# Patient Record
Sex: FEMALE | Race: WHITE | NOT HISPANIC OR LATINO | URBAN - METROPOLITAN AREA
[De-identification: names, ages, dates, MRNs, and addresses within clinical notes are randomized per-mention and may not be internally consistent; named-entity substitution may affect disease eponyms.]

---

## 2020-02-19 ENCOUNTER — EMERGENCY (EMERGENCY)
Facility: HOSPITAL | Age: 65
LOS: 1 days | Discharge: HOME | End: 2020-02-19
Attending: EMERGENCY MEDICINE | Admitting: EMERGENCY MEDICINE
Payer: MEDICARE

## 2020-02-19 VITALS
RESPIRATION RATE: 18 BRPM | SYSTOLIC BLOOD PRESSURE: 175 MMHG | WEIGHT: 136.03 LBS | HEIGHT: 66 IN | DIASTOLIC BLOOD PRESSURE: 100 MMHG | TEMPERATURE: 98 F | HEART RATE: 72 BPM | OXYGEN SATURATION: 97 %

## 2020-02-19 VITALS
TEMPERATURE: 98 F | HEART RATE: 72 BPM | RESPIRATION RATE: 18 BRPM | OXYGEN SATURATION: 96 % | SYSTOLIC BLOOD PRESSURE: 132 MMHG | DIASTOLIC BLOOD PRESSURE: 70 MMHG

## 2020-02-19 PROCEDURE — 73110 X-RAY EXAM OF WRIST: CPT

## 2020-02-19 PROCEDURE — 73090 X-RAY EXAM OF FOREARM: CPT | Mod: 26,RT

## 2020-02-19 PROCEDURE — 73090 X-RAY EXAM OF FOREARM: CPT

## 2020-02-19 PROCEDURE — 25605 CLTX DST RDL FX/EPHYS SEP W/: CPT | Mod: RT

## 2020-02-19 PROCEDURE — 99285 EMERGENCY DEPT VISIT HI MDM: CPT | Mod: 25

## 2020-02-19 PROCEDURE — 96372 THER/PROPH/DIAG INJ SC/IM: CPT | Mod: XU

## 2020-02-19 PROCEDURE — 99284 EMERGENCY DEPT VISIT MOD MDM: CPT

## 2020-02-19 PROCEDURE — 73110 X-RAY EXAM OF WRIST: CPT | Mod: 26,RT

## 2020-02-19 RX ORDER — OXYCODONE AND ACETAMINOPHEN 5; 325 MG/1; MG/1
1 TABLET ORAL ONCE
Refills: 0 | Status: DISCONTINUED | OUTPATIENT
Start: 2020-02-19 | End: 2020-02-19

## 2020-02-19 RX ORDER — HYDROMORPHONE HYDROCHLORIDE 2 MG/ML
1 INJECTION INTRAMUSCULAR; INTRAVENOUS; SUBCUTANEOUS ONCE
Refills: 0 | Status: DISCONTINUED | OUTPATIENT
Start: 2020-02-19 | End: 2020-02-19

## 2020-02-19 RX ORDER — TRAMADOL HYDROCHLORIDE 50 MG/1
1 TABLET ORAL
Qty: 14 | Refills: 0
Start: 2020-02-19 | End: 2020-02-25

## 2020-02-19 RX ADMIN — HYDROMORPHONE HYDROCHLORIDE 1 MILLIGRAM(S): 2 INJECTION INTRAMUSCULAR; INTRAVENOUS; SUBCUTANEOUS at 13:39

## 2020-02-19 RX ADMIN — OXYCODONE AND ACETAMINOPHEN 1 TABLET(S): 5; 325 TABLET ORAL at 11:57

## 2020-02-19 RX ADMIN — OXYCODONE AND ACETAMINOPHEN 1 TABLET(S): 5; 325 TABLET ORAL at 12:39

## 2020-02-19 NOTE — ED PROVIDER NOTE - CLINICAL SUMMARY MEDICAL DECISION MAKING FREE TEXT BOX
63 yo female s/p accidental mechanical fall on right outstretched hand, with pain to her right wrist. Right is dominant for pt. exam findings suspicious for fx. neurovascular injured extremity intact. pending xray and ortho evaluation.

## 2020-02-19 NOTE — ED PROVIDER NOTE - CARE PROVIDER_API CALL
Wesley Monzon)  Orthopaedic Surgery  [address]  [place], [place] [address]  Phone: [phone]  Fax: [phone]  Follow Up Time:
other

## 2020-02-19 NOTE — CONSULT NOTE ADULT - SUBJECTIVE AND OBJECTIVE BOX
Orthopaedic Consult Note    HPI  64yFemale  c/o right wrist pain s/p mechanical fall this morning. Pt states she was stepping off an escalator and missed a step, falling onto an outstretched right hand. She denies head trauma/LOC or pain to other joints. Denies numbness/tingling of right upper extremity. Pt does not ambulate with assistance at baseline. She is right hand dominant.     PAST MEDICAL & SURGICAL HISTORY:  Depression  CHRISTA (mycobacterium avium-intracellulare)  High cholesterol    Allergies    Avelox (Hives)    Intolerances          Vital Signs Last 24 Hrs  T(C): 36.4 (19 Feb 2020 14:53), Max: 36.7 (19 Feb 2020 11:14)  T(F): 97.6 (19 Feb 2020 14:53), Max: 98 (19 Feb 2020 11:14)  HR: 72 (19 Feb 2020 14:53) (72 - 72)  BP: 132/70 (19 Feb 2020 14:53) (132/70 - 175/100)  BP(mean): --  RR: 18 (19 Feb 2020 14:53) (18 - 18)  SpO2: 96% (19 Feb 2020 14:53) (96% - 97%)    Physical Exam:  Pt comfortable with right upper extremity in sling   Gross deformity of right wrist  Brisk capillary refill distal right upper extremity, Radial pulses palpable  SLT in tact to distal right upper extremity   Motor Strength 5/5 to /interossei. AIN/PIN intact. - radian ulnar median nerves in tact       Imaging:   Xray Wrist 3 Views, Right (02.19.20 @ 12:38) >  EXAM:  XR WRIST-RIGHT MIN 3 VIEWS                        EXAM:  XR FOREARM-RIGHT-2 VIEWS                        PROCEDURE DATE:  02/19/2020      Impression:  Acute comminuted fracture of the distal radius with angulation and intra-articular extension.      post reduction:    EXAM:  XR WRIST-RIGHT MIN 3 VIEWS                          PROCEDURE DATE:  02/19/2020          INTERPRETATION:  XR WRIST 3 VIEWS RIGHT dated 2/19/2020 2:08 PM.    INDICATION: Fracture status post reduction    < from: Xray Wrist 3 Views, Right (02.19.20 @ 14:08) >  Impression:  Interval reduction of distal radial fracture.        A/P: 64yFemale w/ right distal radius fracture, intra articular   Pt given hematoma block with 10cc lidocaine, right distal radius fracture reduced and sugar tong splint placed - patient tolerated procedure well and reduction confirmed via XR / reviewed by Dr. Monzon - pt neuro in tact s/p reduction   RUE Sling, NWB RUE   Pain control as needed  Discussed with Dr. Monzon   Patient to follow up with Dr. Monzon outpatient in one week

## 2020-02-19 NOTE — ED PROVIDER NOTE - OBJECTIVE STATEMENT
63 yo female in the ER s/p accidental fall earlier today on the escalator. Pt tried to stop her fall with outstretched hand and c/o pain to her right wrist. Denies any other injuries, denies numbness tingling to her right fingers. Swelling, deformity and few superficial abrasions noted.

## 2020-02-19 NOTE — ED PROVIDER NOTE - NSFOLLOWUPINSTRUCTIONS_ED_ALL_ED_FT
I have discussed the discharge plan with the patient. The patient agrees with the plan, as discussed.  The patient understands Emergency Department diagnosis is a preliminary diagnosis often based on limited information and that the patient must adhere to the follow-up plan as discussed.  The patient understands that if the symptoms worsen or if prescribed medications do not have the desired/planned effect that the patient may return to the Emergency Department at any time for further evaluation and treatment.      Cast or Splint Care, Adult  Casts and splints are supports that are worn to protect broken bones and other injuries. A cast or splint may hold a bone still and in the correct position while it heals. Casts and splints may also help ease pain, swelling, and muscle spasms.  A cast is a hardened support that is usually made of fiberglass or plaster. It is custom-fit to the body and it offers more protection than a splint. It cannot be taken off and put back on. A splint is a type of soft support that is usually made from cloth and elastic. It can be adjusted or taken off as needed.  You may need a cast or a splint if you:  Have a broken bone.Have a soft-tissue injury.Need to keep an injured body part from moving (keep it immobile) after surgery.How is this treated?  If you have a cast:        Do not stick anything inside the cast to scratch your skin. Sticking something in the cast increases your risk of infection.Check the skin around the cast every day. Tell your health care provider about any concerns.You may put lotion on dry skin around the edges of the cast. Do not put lotion on the skin underneath the cast.Keep the cast clean.If the cast is not waterproof:  Do not let it get wet.Cover it with a watertight covering when you take a bath or a shower.If you have a splint:        Wear it as told by your health care provider. Remove it only as told by your health care provider.Loosen the splint if your fingers or toes tingle, become numb, or turn cold and blue.Keep the splint clean.If the splint is not waterproof:  Do not let it get wet.Cover it with a watertight covering when you take a bath or a shower.Bathing     Do not take baths or swim until your health care provider approves. Ask your health care provider if you can take showers. You may only be allowed to take sponge baths for bathing.If your cast or splint is not waterproof, cover it with a watertight covering when you take a bath or shower.Managing pain, stiffness, and swelling     Move your fingers or toes often to avoid stiffness and to lessen swelling.Raise (elevate) the injured area above the level of your heart while sitting or lying down.Safety     Do not use the injured limb to support your body weight until your health care provider says that it is okay.Use crutches or other assistive devices as told by your health care provider.General instructions     Do not put pressure on any part of the cast or splint until it is fully hardened. This may take several hours.Return to your normal activities as told by your health care provider. Ask your health care provider what activities are safe for you.Take over-the-counter and prescription medicines only as told by your health care provider.Keep all follow-up visits as told by your health care provider. This is important.Contact a health care provider if:  Your cast or splint gets damaged.The skin around the cast gets red or raw.The skin under the cast is extremely itchy or painful.Your cast or splint feels very uncomfortable.Your cast or splint is too tight or too loose.Your cast becomes wet or it develops a soft spot or area.You get an object stuck under your cast.Get help right away if:  Your pain is getting worse.The injured area tingles, becomes numb, or turns cold and blue.The part of your body above or below the cast is swollen and discolored.You cannot feel or move your fingers or toes.There is fluid leaking through the cast.You have severe pain or pressure under the cast.You have trouble breathing.You have shortness of breath.You have chest pain.This information is not intended to replace advice given to you by your health care provider. Make sure you discuss any questions you have with your health care provider.      Colles Fracture     Colles fracture is a type of broken wrist. It means that the radius bone is broken or cracked near the wrist joint. The radius is one of two bones in the forearm. It is on the same side as the thumb. The other forearm bone is called the ulna. Often, when someone has a Colles fracture, the ulna is also broken.  As this injury heals, a splint or a cast is used to prevent the injured bone from moving (keep it immobilized).  What are the causes?  Common causes of this type of fracture include:  A hard, direct hit to the wrist.Accidents, such as a car accident or falling on an outstretched hand.What increases the risk?  You may be at higher risk for this type of fracture if you:  Play contact sports or high-risk sports, such as skiing, biking, or ice-skating.Smoke.Drink more than three alcoholic beverages a day.Have low or lowered bone density (osteoporosis or osteopenia).Are a young child or an older adult.Are a woman who has gone through menopause.Have a history of bone fractures.Are not getting enough (have a deficiency in) calcium or vitamin D.What are the signs or symptoms?  Symptoms of a Colles fracture may include:  Tenderness, bruising, and swelling over the fracture, which is usually near the wrist.The wrist hanging in an odd position or looking misshapen (deformed).Difficulty moving the wrist.How is this diagnosed?  This condition may be diagnosed based on:  A physical exam.A forearm X-ray.Your symptoms and medical history.How is this treated?  Treatment depends on many factors, including your age, your activity level, and how severe your fracture is. Treatment may include:  Immobilizing the wrist with a splint or a cast for several weeks. Before a splint or cast is placed on the arm, the health care provider may move the fractured bone or bones back into place (realignment).Surgery, if the bone is completely out of place (displaced). Metal pins or other devices may be used to help hold the bone in place while it heals. After surgery, the arm is put in a splint or cast.Physical therapy.Follow these instructions at home:  If you have a splint:     Wear the splint as told by your health care provider. Remove it only as told by your health care provider.Loosen the splint if your fingers tingle, become numb, or turn cold and blue.Keep the splint clean.If your splint is not waterproof:  Do not let it get wet.Cover it with a watertight covering when you take a bath or a shower.If you have a cast:     Do not stick anything inside the cast to scratch your skin. Doing that increases your risk for infection.Check the skin around the cast every day. Tell your health care provider about any concerns.You may put lotion on dry skin around the edges of the cast. Do not put lotion on the skin underneath the cast.Keep the cast clean.If the cast is not waterproof:   Do not let it get wet. Cover it with a watertight covering when you take a bath or a shower.Managing pain, stiffness, and swelling        If directed, put ice on the injured area:  If you have a removable splint, remove it as told by your health care provider.Put ice in a plastic bag.Place a towel between your skin and the bag, or between your cast and the bag.Leave the ice on for 20 minutes, 2–3 times a day.Move your fingers often to avoid stiffness and to lessen swelling.Raise (elevate) your wrist above the level of your heart while you are sitting or lying down.Driving     Do not drive or use heavy machinery while taking prescription pain medicine.Ask your health care provider if it is safe for you to drive if you have a splint or cast on your arm.Activity     Do not lift anything that is heavier than 10 lb (4.5 kg), or the limit that you are told, until your health care provider says that it is safe.Do not use your arm to support your body weight until your health care provider says that you can.Return to your normal activities as told by your health care provider. Ask your health care provider what activities are safe for you.If physical therapy was prescribed, do exercises as told by your health care provider.General instructions     Do not put pressure on any part of the splint or cast until it is fully hardened. This may take several hours.Do not use any products that contain nicotine or tobacco, such as cigarettes and e-cigarettes. These can delay bone healing. If you need help quitting, ask your health care provider.Take over-the-counter and prescription medicines only as told by your health care provider. Keep all follow-up visits as told by your health care provider. This is important.Contact a health care provider if:  Your splint or cast:  Gets wet.Gets damaged.Suddenly feels too tight.You have:  A fever or chills.Pain that does not get better with medicine.Swelling that gets worse.Get help right away if:  Your hand or fingernails:  Turn blue or gray.Feel cold or numb.You have tingling or numbness in your fingers, even after you loosen your splint (if this applies).Summary  Colles fracture is a type of broken wrist. It often involves both of the bones in the forearm (radius and ulna).Fractures are common in young, active people who are involved in high-energy activities. They are also common in older people who are at risk for osteoporosis.This injury is diagnosed with a physical exam and X-rays.Your wrist will need to be held in place (immobilized) with a splint or cast for several weeks. You may need surgery for a more severe fracture.This information is not intended to replace advice given to you by your health care provider. Make sure you discuss any questions you have with your health care provider.

## 2020-02-19 NOTE — ED PROVIDER NOTE - PATIENT PORTAL LINK FT
You can access the FollowMyHealth Patient Portal offered by [facility] by registering at the following website: [email or web address]. By joining [facility]’s FollowMyHealth portal, you will also be able to view your health information using other applications (apps) compatible with our system.

## 2020-02-19 NOTE — ED PROVIDER NOTE - MUSCULOSKELETAL, MLM
Spine appears normal, range of motion is not limited,  right wrist deformity, tenderness, decreased ROM due to pain, good distal pulses, normal sensations,

## 2020-02-19 NOTE — ED PROVIDER NOTE - ATTENDING CONTRIBUTION TO CARE
64F p/w FOOSH R wrist, mechanical, no LOC, denies other injuries. Exam noted for deformity, nv intact. Concern fx, dislocation, no e/o open fx. Ortho consulted, they are splinting.

## 2020-02-20 ENCOUNTER — FORM ENCOUNTER (OUTPATIENT)
Age: 65
End: 2020-02-20

## 2020-02-21 ENCOUNTER — NON-APPOINTMENT (OUTPATIENT)
Age: 65
End: 2020-02-21

## 2020-02-21 ENCOUNTER — APPOINTMENT (OUTPATIENT)
Dept: PULMONOLOGY | Facility: CLINIC | Age: 65
End: 2020-02-21
Payer: MEDICARE

## 2020-02-21 ENCOUNTER — OUTPATIENT (OUTPATIENT)
Dept: OUTPATIENT SERVICES | Facility: HOSPITAL | Age: 65
LOS: 1 days | End: 2020-02-21
Payer: MEDICARE

## 2020-02-21 VITALS
SYSTOLIC BLOOD PRESSURE: 120 MMHG | WEIGHT: 130 LBS | HEIGHT: 66 IN | DIASTOLIC BLOOD PRESSURE: 70 MMHG | HEART RATE: 74 BPM | TEMPERATURE: 97.9 F | OXYGEN SATURATION: 97 % | BODY MASS INDEX: 20.89 KG/M2

## 2020-02-21 DIAGNOSIS — Z86.59 PERSONAL HISTORY OF OTHER MENTAL AND BEHAVIORAL DISORDERS: ICD-10-CM

## 2020-02-21 DIAGNOSIS — Z86.39 PERSONAL HISTORY OF OTHER ENDOCRINE, NUTRITIONAL AND METABOLIC DISEASE: ICD-10-CM

## 2020-02-21 DIAGNOSIS — Z82.49 FAMILY HISTORY OF ISCHEMIC HEART DISEASE AND OTHER DISEASES OF THE CIRCULATORY SYSTEM: ICD-10-CM

## 2020-02-21 DIAGNOSIS — Z87.891 PERSONAL HISTORY OF NICOTINE DEPENDENCE: ICD-10-CM

## 2020-02-21 PROBLEM — Z00.00 ENCOUNTER FOR PREVENTIVE HEALTH EXAMINATION: Status: ACTIVE | Noted: 2020-02-21

## 2020-02-21 PROBLEM — F32.9 MAJOR DEPRESSIVE DISORDER, SINGLE EPISODE, UNSPECIFIED: Chronic | Status: ACTIVE | Noted: 2020-02-19

## 2020-02-21 PROBLEM — A31.0 PULMONARY MYCOBACTERIAL INFECTION: Chronic | Status: ACTIVE | Noted: 2020-02-19

## 2020-02-21 PROBLEM — E78.00 PURE HYPERCHOLESTEROLEMIA, UNSPECIFIED: Chronic | Status: ACTIVE | Noted: 2020-02-19

## 2020-02-21 PROCEDURE — 99204 OFFICE O/P NEW MOD 45 MIN: CPT | Mod: 25

## 2020-02-21 PROCEDURE — 71046 X-RAY EXAM CHEST 2 VIEWS: CPT | Mod: 26

## 2020-02-21 PROCEDURE — 36415 COLL VENOUS BLD VENIPUNCTURE: CPT

## 2020-02-21 PROCEDURE — 93000 ELECTROCARDIOGRAM COMPLETE: CPT

## 2020-02-21 RX ORDER — BUPROPION HYDROCHLORIDE 300 MG/1
TABLET, EXTENDED RELEASE ORAL
Refills: 0 | Status: ACTIVE | COMMUNITY

## 2020-02-21 RX ORDER — ATORVASTATIN CALCIUM 80 MG/1
TABLET, FILM COATED ORAL
Refills: 0 | Status: ACTIVE | COMMUNITY

## 2020-02-21 RX ORDER — FLUTICASONE FUROATE, UMECLIDINIUM BROMIDE AND VILANTEROL TRIFENATATE 100; 62.5; 25 UG/1; UG/1; UG/1
100-62.5-25 POWDER RESPIRATORY (INHALATION)
Refills: 0 | Status: ACTIVE | COMMUNITY

## 2020-02-21 NOTE — PHYSICAL EXAM
[Well Nourished] : well nourished [No Acute Distress] : no acute distress [Well Developed] : well developed [Well-Appearing] : well-appearing [Normal Sclera/Conjunctiva] : normal sclera/conjunctiva [EOMI] : extraocular movements intact [PERRL] : pupils equal round and reactive to light [No JVD] : no jugular venous distention [Normal Oropharynx] : the oropharynx was normal [Normal Outer Ear/Nose] : the outer ears and nose were normal in appearance [No Lymphadenopathy] : no lymphadenopathy [Supple] : supple [No Respiratory Distress] : no respiratory distress  [Thyroid Normal, No Nodules] : the thyroid was normal and there were no nodules present [No Accessory Muscle Use] : no accessory muscle use [Normal Rate] : normal rate  [Clear to Auscultation] : lungs were clear to auscultation bilaterally [Normal S1, S2] : normal S1 and S2 [No Murmur] : no murmur heard [Regular Rhythm] : with a regular rhythm [No Varicosities] : no varicosities [No Abdominal Bruit] : a ~M bruit was not heard ~T in the abdomen [No Carotid Bruits] : no carotid bruits [No Edema] : there was no peripheral edema [Pedal Pulses Present] : the pedal pulses are present [No Extremity Clubbing/Cyanosis] : no extremity clubbing/cyanosis [No Palpable Aorta] : no palpable aorta [Soft] : abdomen soft [No Masses] : no abdominal mass palpated [Non-distended] : non-distended [Non Tender] : non-tender [Normal Posterior Cervical Nodes] : no posterior cervical lymphadenopathy [No HSM] : no HSM [Normal Bowel Sounds] : normal bowel sounds [No CVA Tenderness] : no CVA  tenderness [Normal Anterior Cervical Nodes] : no anterior cervical lymphadenopathy [No Spinal Tenderness] : no spinal tenderness [No Joint Swelling] : no joint swelling [Grossly Normal Strength/Tone] : grossly normal strength/tone [Coordination Grossly Intact] : coordination grossly intact [No Focal Deficits] : no focal deficits [No Rash] : no rash [Normal Gait] : normal gait [Normal Affect] : the affect was normal [Deep Tendon Reflexes (DTR)] : deep tendon reflexes were 2+ and symmetric [Normal Insight/Judgement] : insight and judgment were intact

## 2020-02-21 NOTE — ASSESSMENT
[No Further Testing Recommended] : no further testing recommended [Patient Optimized for Surgery] : Patient optimized for surgery [ECG] : ECG [As per surgery] : as per surgery [FreeTextEntry4] : No contraindication for surgery. The medical condition is optimized for surgery. There is no evidence neither of angina, CHF, arrhythmias, nor valvular disease. There is no limitation of exercise capacity. VALDEMAR FALCON is to be extubated once fully awake and able to protect airway. she is to be monitored in the recovery room.  They might benefit for high flow oxygen or noninvasive ventilation to prevent or reverse atelectasis.  Avoid oversedation. Patient is high risk for DVT and will require bimodal agents for DVT prophylaxis early mobilization is recommended. Patient is to use the incentive spirometry postoperative.  The patient was given instructions regarding the preoperative preparation. I instructed the patient to notify me if there is any change in the clinical status prior the surgery including any skin manifestations. No aspirin or NSAIDs, Naproxen, LIMA inhibitors, herbs, green tea and vitamins prior to surgery. NPO after midnight prior to surg. \par \par - follow on lab work and CXR \par \par CHRISTA/bronchiectasis - currently controlled pt on treatment.  Denies cough, hemoptysis or weight loss. \par \par COPD - controlled on current triple therapy.  Denies any steroid use or hospitalizations.  \par \par HLD - pt tolerating statin follow with lipid panel with PCP\par \par Pt referred to Dr. Oliveira for PCP \par

## 2020-02-21 NOTE — RESULTS/DATA
[ECG Reviewed] : reviewed [Normal] : The 12 - lead ECG is normal [de-identified] : pending [de-identified] : pending [de-identified] : pending  [de-identified] : pending [FreeTextEntry1] : left atrial enlargement

## 2020-02-21 NOTE — HISTORY OF PRESENT ILLNESS
[COPD] : COPD [No Pertinent Cardiac History] : no history of aortic stenosis, atrial fibrillation, coronary artery disease, recent myocardial infarction, or implantable device/pacemaker [No Adverse Anesthesia Reaction] : no adverse anesthesia reaction in self or family member [Chronic Kidney Disease] : no chronic kidney disease [Diabetes] : no diabetes [(Patient denies any chest pain, claudication, dyspnea on exertion, orthopnea, palpitations or syncope)] : Patient denies any chest pain, claudication, dyspnea on exertion, orthopnea, palpitations or syncope [FreeTextEntry1] : orif right distal radius [FreeTextEntry2] : 2/24/2020 [FreeTextEntry4] : 64 yr old female with CHRISTA, bronchiectasis, COPD  Former smoker Quit 1/17/2000, approx 20 yrs 2.5 - 1 pack a day, HLD and depression.  Presents today for medical clearance for surgery on right wrist.  She [place] trying to catch the subway.\par \par Pt is following with pulmonologist for her CHRISTA and was placed on treatment 8/2019 ([facility] [phone]).  She is doing better with treatment following with frequent eye exams.  She has gained weight on therapy, cough improved and denies any hemoptysis. She is on trelegy which helps and has not needed prednisone.  \par \par Denies chest pain, abdominal pain, wheezing, diarrhea, fevers, palpitations.   [FreeTextEntry3] : Dr. Wesley Monzon [Spouse] : spouse

## 2020-02-22 ENCOUNTER — APPOINTMENT (OUTPATIENT)
Dept: CT IMAGING | Facility: HOSPITAL | Age: 65
End: 2020-02-22
Payer: MEDICARE

## 2020-02-22 ENCOUNTER — OUTPATIENT (OUTPATIENT)
Dept: OUTPATIENT SERVICES | Facility: HOSPITAL | Age: 65
LOS: 1 days | End: 2020-02-22

## 2020-02-22 PROCEDURE — 73200 CT UPPER EXTREMITY W/O DYE: CPT | Mod: 26,RT

## 2020-02-24 ENCOUNTER — OUTPATIENT (OUTPATIENT)
Dept: INPATIENT UNIT | Facility: HOSPITAL | Age: 65
LOS: 1 days | Discharge: HOME | End: 2020-02-24
Payer: MEDICARE

## 2020-02-24 VITALS
RESPIRATION RATE: 16 BRPM | WEIGHT: 131.62 LBS | DIASTOLIC BLOOD PRESSURE: 77 MMHG | HEIGHT: 66 IN | SYSTOLIC BLOOD PRESSURE: 127 MMHG | HEART RATE: 69 BPM | OXYGEN SATURATION: 97 % | TEMPERATURE: 98 F

## 2020-02-24 VITALS
RESPIRATION RATE: 15 BRPM | HEART RATE: 80 BPM | SYSTOLIC BLOOD PRESSURE: 115 MMHG | OXYGEN SATURATION: 95 % | DIASTOLIC BLOOD PRESSURE: 76 MMHG

## 2020-02-24 DIAGNOSIS — Z98.890 OTHER SPECIFIED POSTPROCEDURAL STATES: Chronic | ICD-10-CM

## 2020-02-24 LAB
ALBUMIN SERPL ELPH-MCNC: 4.7 G/DL
ALP BLD-CCNC: 88 U/L
ALT SERPL-CCNC: 19 U/L
ANION GAP SERPL CALC-SCNC: 19 MMOL/L
APPEARANCE: CLEAR
APTT BLD: 32.4 SEC
AST SERPL-CCNC: 20 U/L
BASOPHILS # BLD AUTO: 0.08 K/UL
BASOPHILS NFR BLD AUTO: 0.9 %
BILIRUB SERPL-MCNC: 0.2 MG/DL
BILIRUBIN URINE: NEGATIVE
BLOOD URINE: NEGATIVE
BUN SERPL-MCNC: 13 MG/DL
CALCIUM SERPL-MCNC: 9.8 MG/DL
CHLORIDE SERPL-SCNC: 103 MMOL/L
CO2 SERPL-SCNC: 24 MMOL/L
COLOR: NORMAL
CREAT SERPL-MCNC: 0.79 MG/DL
EOSINOPHIL # BLD AUTO: 0.2 K/UL
EOSINOPHIL NFR BLD AUTO: 2.1 %
GLUCOSE QUALITATIVE U: NEGATIVE
GLUCOSE SERPL-MCNC: 60 MG/DL
HCT VFR BLD CALC: 44.8 %
HGB BLD-MCNC: 14.1 G/DL
IMM GRANULOCYTES NFR BLD AUTO: 0.7 %
INR PPP: 0.99 RATIO
KETONES URINE: NEGATIVE
LEUKOCYTE ESTERASE URINE: NEGATIVE
LYMPHOCYTES # BLD AUTO: 1.83 K/UL
LYMPHOCYTES NFR BLD AUTO: 19.5 %
MAN DIFF?: NORMAL
MCHC RBC-ENTMCNC: 30.7 PG
MCHC RBC-ENTMCNC: 31.5 GM/DL
MCV RBC AUTO: 97.6 FL
MONOCYTES # BLD AUTO: 0.86 K/UL
MONOCYTES NFR BLD AUTO: 9.2 %
NEUTROPHILS # BLD AUTO: 6.33 K/UL
NEUTROPHILS NFR BLD AUTO: 67.6 %
NITRITE URINE: NEGATIVE
PH URINE: 5
PLATELET # BLD AUTO: 221 K/UL
POTASSIUM SERPL-SCNC: 4.8 MMOL/L
PROT SERPL-MCNC: 6.8 G/DL
PROTEIN URINE: NEGATIVE
PT BLD: 11.4 SEC
RBC # BLD: 4.59 M/UL
RBC # FLD: 13.7 %
SODIUM SERPL-SCNC: 146 MMOL/L
SPECIFIC GRAVITY URINE: 1.01
UROBILINOGEN URINE: NORMAL
WBC # FLD AUTO: 9.37 K/UL

## 2020-02-24 RX ORDER — OXYCODONE AND ACETAMINOPHEN 5; 325 MG/1; MG/1
1 TABLET ORAL
Qty: 30 | Refills: 0
Start: 2020-02-24 | End: 2020-02-28

## 2020-02-24 NOTE — BRIEF OPERATIVE NOTE - NSICDXBRIEFPROCEDURE_GEN_ALL_CORE_FT
PROCEDURES:  Open reduction and internal fixation of right distal radius and ulna 24-Feb-2020 11:46:58  Giovanyn Gilbert

## 2020-02-26 DIAGNOSIS — S52.501A UNSPECIFIED FRACTURE OF THE LOWER END OF RIGHT RADIUS, INITIAL ENCOUNTER FOR CLOSED FRACTURE: ICD-10-CM

## 2020-02-26 DIAGNOSIS — Z79.2 LONG TERM (CURRENT) USE OF ANTIBIOTICS: ICD-10-CM

## 2020-02-26 DIAGNOSIS — E78.00 PURE HYPERCHOLESTEROLEMIA, UNSPECIFIED: ICD-10-CM

## 2020-02-26 DIAGNOSIS — Z88.8 ALLERGY STATUS TO OTHER DRUGS, MEDICAMENTS AND BIOLOGICAL SUBSTANCES: ICD-10-CM
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
## 2020-02-26 DIAGNOSIS — Z79.899 OTHER LONG TERM (CURRENT) DRUG THERAPY: ICD-10-CM

## 2020-02-26 DIAGNOSIS — M25.531 PAIN IN RIGHT WRIST: ICD-10-CM
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
## 2020-02-26 DIAGNOSIS — S52.121A DISPLACED FRACTURE OF HEAD OF RIGHT RADIUS, INITIAL ENCOUNTER FOR CLOSED FRACTURE: ICD-10-CM

## 2020-03-03 PROCEDURE — 71046 X-RAY EXAM CHEST 2 VIEWS: CPT

## 2020-03-03 PROCEDURE — 73200 CT UPPER EXTREMITY W/O DYE: CPT

## 2020-03-03 PROCEDURE — C1713: CPT

## 2020-03-03 PROCEDURE — 76000 FLUOROSCOPY <1 HR PHYS/QHP: CPT

## 2020-03-03 PROCEDURE — 25608 OPTX DST RD XART FX/EPI SEP2: CPT | Mod: RT

## 2020-03-03 PROCEDURE — 25001 INCISE FLEXOR CARPI RADIALIS: CPT | Mod: RT

## 2020-05-19 NOTE — ED PROVIDER NOTE - DISPOSITION TYPE
[Time Spent: ___ minutes] : I have spent [unfilled] minutes of time on the encounter. [>50% of the face to face encounter time was spent on counseling and/or coordination of care for ___] : Greater than 50% of the face to face encounter time was spent on counseling and/or coordination of care for [unfilled] DISCHARGE

## 2020-12-02 NOTE — PATIENT PROFILE ADULT - NSPRESCRALCFREQ_GEN_A_NUR
Dorsal Nasal Flap Text: The defect edges were debeveled with a #15 scalpel blade.  Given the location of the defect and the proximity to free margins a dorsal nasal flap was deemed most appropriate.  Using a sterile surgical marker, an appropriate dorsal nasal flap was drawn around the defect.    The area thus outlined was incised deep to adipose tissue with a #15 scalpel blade.  The skin margins were undermined to an appropriate distance in all directions utilizing iris scissors. 4 or more times a week

## 2021-02-09 NOTE — PATIENT PROFILE ADULT - HAS THE PATIENT BEEN ADMITTED FROM SHORT TERM REHAB?
Problem: Pain  Goal: #Acceptable pain level achieved/maintained at rest using NRS/Faces  Description: This goal is used for patients who can self-report.  Acceptable means the level is at or below the identified comfort/function goal.  Outcome: Outcome Met, Continue evaluating goal progress toward completion     Problem: At Risk for Falls  Goal: # Patient does not fall  Outcome: Outcome Met, Continue evaluating goal progress toward completion      no

## 2021-03-17 ENCOUNTER — OUTPATIENT (OUTPATIENT)
Dept: OUTPATIENT SERVICES | Facility: HOSPITAL | Age: 66
LOS: 1 days | End: 2021-03-17
Payer: MEDICARE

## 2021-03-17 DIAGNOSIS — Z98.890 OTHER SPECIFIED POSTPROCEDURAL STATES: Chronic | ICD-10-CM

## 2021-03-17 PROBLEM — J47.9 BRONCHIECTASIS, UNCOMPLICATED: Chronic | Status: ACTIVE | Noted: 2020-02-24

## 2021-03-17 PROCEDURE — 73080 X-RAY EXAM OF ELBOW: CPT

## 2021-03-17 PROCEDURE — 73080 X-RAY EXAM OF ELBOW: CPT | Mod: 26,RT

## 2022-03-03 NOTE — PATIENT PROFILE ADULT - DO YOU FEEL UNSAFE AT SCHOOL?
LTG 1: Stairs - Pt will be independent with negotiation of 2 steps within 4 weeks./balance training/bed mobility training/gait training/transfer training
no

## 2023-03-27 ENCOUNTER — EMERGENCY (EMERGENCY)
Facility: HOSPITAL | Age: 68
LOS: 1 days | Discharge: LEFT BEFORE DISCHARGE | End: 2023-03-27
Attending: EMERGENCY MEDICINE | Admitting: EMERGENCY MEDICINE
Payer: MEDICARE

## 2023-03-27 VITALS
HEART RATE: 70 BPM | DIASTOLIC BLOOD PRESSURE: 92 MMHG | RESPIRATION RATE: 20 BRPM | TEMPERATURE: 98 F | WEIGHT: 121.03 LBS | SYSTOLIC BLOOD PRESSURE: 178 MMHG | OXYGEN SATURATION: 98 %
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
## 2023-03-27 DIAGNOSIS — J47.9 BRONCHIECTASIS, UNCOMPLICATED: ICD-10-CM

## 2023-03-27 DIAGNOSIS — Z86.19 PERSONAL HISTORY OF OTHER INFECTIOUS AND PARASITIC DISEASES: ICD-10-CM
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
## 2023-03-27 DIAGNOSIS — Z98.890 OTHER SPECIFIED POSTPROCEDURAL STATES: Chronic | ICD-10-CM

## 2023-03-27 DIAGNOSIS — Z88.5 ALLERGY STATUS TO NARCOTIC AGENT: ICD-10-CM

## 2023-03-27 DIAGNOSIS — F32.A DEPRESSION, UNSPECIFIED: ICD-10-CM

## 2023-03-27 DIAGNOSIS — S52.572A OTHER INTRAARTICULAR FRACTURE OF LOWER END OF LEFT RADIUS, INITIAL ENCOUNTER FOR CLOSED FRACTURE: ICD-10-CM

## 2023-03-27 DIAGNOSIS — E78.00 PURE HYPERCHOLESTEROLEMIA, UNSPECIFIED: ICD-10-CM

## 2023-03-27 DIAGNOSIS — Z88.1 ALLERGY STATUS TO OTHER ANTIBIOTIC AGENTS STATUS: ICD-10-CM
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
## 2023-03-27 DIAGNOSIS — S69.92XA UNSPECIFIED INJURY OF LEFT WRIST, HAND AND FINGER(S), INITIAL ENCOUNTER: ICD-10-CM

## 2023-03-27 DIAGNOSIS — Z87.81 PERSONAL HISTORY OF (HEALED) TRAUMATIC FRACTURE: ICD-10-CM

## 2023-03-27 DIAGNOSIS — Z53.29 PROCEDURE AND TREATMENT NOT CARRIED OUT BECAUSE OF PATIENT'S DECISION FOR OTHER REASONS: ICD-10-CM

## 2023-03-27 PROCEDURE — 99285 EMERGENCY DEPT VISIT HI MDM: CPT | Mod: 25

## 2023-03-27 PROCEDURE — 73070 X-RAY EXAM OF ELBOW: CPT

## 2023-03-27 PROCEDURE — 73110 X-RAY EXAM OF WRIST: CPT

## 2023-03-27 PROCEDURE — 25605 CLTX DST RDL FX/EPHYS SEP W/: CPT | Mod: 54,LT

## 2023-03-27 PROCEDURE — 99285 EMERGENCY DEPT VISIT HI MDM: CPT | Mod: FS

## 2023-03-27 PROCEDURE — 96372 THER/PROPH/DIAG INJ SC/IM: CPT | Mod: XU

## 2023-03-27 PROCEDURE — 73100 X-RAY EXAM OF WRIST: CPT

## 2023-03-27 PROCEDURE — 73100 X-RAY EXAM OF WRIST: CPT | Mod: 26,59,LT

## 2023-03-27 PROCEDURE — 99284 EMERGENCY DEPT VISIT MOD MDM: CPT | Mod: 25

## 2023-03-27 PROCEDURE — 29075 APPL CST ELBW FNGR SHORT ARM: CPT | Mod: LT,59

## 2023-03-27 PROCEDURE — 73070 X-RAY EXAM OF ELBOW: CPT | Mod: 26,LT

## 2023-03-27 PROCEDURE — 73110 X-RAY EXAM OF WRIST: CPT | Mod: 26,LT

## 2023-03-27 PROCEDURE — 25605 CLTX DST RDL FX/EPHYS SEP W/: CPT | Mod: LT

## 2023-03-27 RX ORDER — HYDROMORPHONE HYDROCHLORIDE 2 MG/ML
0.5 INJECTION INTRAMUSCULAR; INTRAVENOUS; SUBCUTANEOUS ONCE
Refills: 0 | Status: DISCONTINUED | OUTPATIENT
Start: 2023-03-27 | End: 2023-03-27

## 2023-03-27 RX ADMIN — HYDROMORPHONE HYDROCHLORIDE 0.5 MILLIGRAM(S): 2 INJECTION INTRAMUSCULAR; INTRAVENOUS; SUBCUTANEOUS at 14:43

## 2023-03-27 NOTE — ED PROVIDER NOTE - ENMT, MLM
Airway patent, Nasal mucosa clear. Mouth with normal mucosa. No head trauma. No midline c-spine tenderness. Full ROM of neck.

## 2023-03-27 NOTE — ED ADULT NURSE NOTE - OBJECTIVE STATEMENT
Pt A&Ox4, ambulatory with steady gait, speaking in clear/full sentences, no acute distress, vital signs stable. Pt presents to the ED after falling during exercise class during a balance exercise. Pt endorses left wrist pain.

## 2023-03-27 NOTE — ED PROVIDER NOTE - CLINICAL SUMMARY MEDICAL DECISION MAKING FREE TEXT BOX
66 y/o F presents to ED with injury to left wrist pain and exam concerning for colles fracture. Pt is NVI. No evidence of complication. Will obtain XR, provide analgesia, and consult ortho. Plan for closed reduction and splinting.

## 2023-03-27 NOTE — ED PROVIDER NOTE - NSICDXPASTMEDICALHX_GEN_ALL_CORE_FT
PAST MEDICAL HISTORY:  Bronchiectasis     Depression     High cholesterol     CHRISTA (mycobacterium avium-intracellulare)

## 2023-03-27 NOTE — CONSULT NOTE ADULT - SUBJECTIVE AND OBJECTIVE BOX
Orthopaedic Surgery Consult Note    CC: Patient is a 67y old  Female who presents with a chief complaint of left wrist pain.    HPI: 66yo F pmh MAC (on doxycycline) presents with left wrist pain x 1 day. Patient states she was at the gym this morning when she fell onto her buttock and left arm off of a half ball exercise . Pt denies head strike, denies LOC, denies left elbow pain.         ROS: Positive for  Denies fevers, chills, headache, dizziness, chest pain, shortness of breath, nausea, vomiting.   All other systems negative, except for above.     Allergies    Avelox (Hives)  oxycodone (Pruritus)    Intolerances      PAST MEDICAL & SURGICAL HISTORY:  High cholesterol      CHRISTA (mycobacterium avium-intracellulare)      Depression      Bronchiectasis      S/P ACL surgery  1984        Social History:    FAMILY HISTORY:    Medications:     Vital Signs Last 24 Hrs  T(C): 36.6 (27 Mar 2023 13:16), Max: 36.6 (27 Mar 2023 13:16)  T(F): 97.9 (27 Mar 2023 13:16), Max: 97.9 (27 Mar 2023 13:16)  HR: 70 (27 Mar 2023 13:16) (70 - 70)  BP: 178/92 (27 Mar 2023 13:16) (178/92 - 178/92)  BP(mean): --  RR: 20 (27 Mar 2023 13:16) (20 - 20)  SpO2: 98% (27 Mar 2023 13:16) (98% - 98%)    Parameters below as of 27 Mar 2023 13:16  Patient On (Oxygen Delivery Method): room air        PE:  General: Well developed, well nourished, in no acute distress, comfortable  Neuro: Alert, oriented x 3  Psych: Normal mood & affect   Skin: Warm, dry, extremities well perfused, no obvious rash  MSK:    -Inspection/palpation:    -Sensation:    -Motor:     -ROM:    -Pulses:                   Imaging:     A/P: 67yFemale      Risks and benefits were discussed for   Weight bearing status  Conservative treatment rest, ice, elevation, NSAIDs  Patient history, exam, imaging and plan discussed with   who is in agreement    Ortho Pager [phone]    ___ minutes spent on total encounter, over 50% of the visit was spent counseling and/or coordinating care.    Orthopaedic Surgery Consult Note    CC: Patient is a 67y old  Female who presents with a chief complaint of left wrist pain.    HPI: 66yo F pmhx MAC (on doxycycline) presents with left wrist pain x 1 day. Patient states she was at the gym this morning when she fell onto her buttock and left arm off of a half ball exercise . Pt denies head strike, denies LOC, denies left elbow pain, denies numbness and tingling, denies any other injuries from fall. Pt is known to Dr. Monzon who treated her in 2020 for right distal radius fracture with ORIF.         ROS: Positive for above  All other systems negative, except for above.     Allergies    Avelox (Hives)  oxycodone (Pruritus)    Intolerances      PAST MEDICAL & SURGICAL HISTORY:  High cholesterol  CHRISTA (mycobacterium avium-intracellulare)  Depression  Bronchiectasis      S/P ACL surgery  1984        Social History:    FAMILY HISTORY:    Medications:     Vital Signs Last 24 Hrs  T(C): 36.6 (27 Mar 2023 13:16), Max: 36.6 (27 Mar 2023 13:16)  T(F): 97.9 (27 Mar 2023 13:16), Max: 97.9 (27 Mar 2023 13:16)  HR: 70 (27 Mar 2023 13:16) (70 - 70)  BP: 178/92 (27 Mar 2023 13:16) (178/92 - 178/92)  BP(mean): --  RR: 20 (27 Mar 2023 13:16) (20 - 20)  SpO2: 98% (27 Mar 2023 13:16) (98% - 98%)    Parameters below as of 27 Mar 2023 13:16  Patient On (Oxygen Delivery Method): room air        PE:  VS: stable, reviewed per nursing documentation  General: No acute distress, resting comfortably  Neuro: Alert, oriented x 3  Psych: Normal mood & affect  HEENT: normocephalic, atraumatic   Neck: trachea midline  Respiratory: nonlabored on room air   CV: no cyanosis, no peripheral edema   Skin: Warm, dry, no rash, no lesions, no abrasions no ecchymosis   MSK: atraumatic with exception of below  LUE    -Inspection/palpation: no break in skin, superficial abrasion on distal volar forearm ,  + tenderness to palpation over the dorsum of the radius, + swelling over the left wrist generalized, + deformity of the left wrist with dorsal angulation    -ROM: limited AROM/PROM secondary to pain of the left wrist secondary to pain, able to wiggle fingers    -Motor: triceps/biceps/ strength 5/5 bilateral upper extremities    - AIN/PIN/Ulnar/Median: firing LUE     -Sensation: intact to light touch bilateral upper extremities    -Pulses: 2+ radial pulses bilaterally, symmetric          Imaging: XR Left wrist: AP/lateral: left comminuted distal radius fracture.   XR left elbow - no evidence of fracture    A/P: 67yFemale with left wrist fracture  - Sugar tong splint applied: NVI following splint application, wiggling fingers appropriately  - Keep left sling on at all times, do not remove splint, keep dry   - Weight bearing status: Nonweightbearing left upper extremity, sling   - Pain control  - Post reduction films  - Follow up with Dr. Monzon outpatient tomorrow or Wednesday for surgical planning  - Went back to ED to see patient and her and her  already discharged  Patient history, exam, imaging and plan discussed with Dr. Monzon who is in agreement    Ortho Pager [phone]   Orthopaedic Surgery Consult Note    CC: Patient is a 67y old  Female who presents with a chief complaint of left wrist pain.    HPI: 66yo F pmhx MAC (on doxycycline) presents with left wrist pain x 1 day. Patient states she was at the gym this morning when she fell onto her buttock and left arm off of a half ball exercise . Pt denies head strike, denies LOC, denies left elbow pain, denies numbness and tingling, denies any other injuries from fall. Pt is known to Dr. Monzon who treated her in 2020 for right distal radius fracture with ORIF.         ROS: Positive for above  All other systems negative, except for above.     Allergies    Avelox (Hives)  oxycodone (Pruritus)    Intolerances      PAST MEDICAL & SURGICAL HISTORY:  High cholesterol  CHRISTA (mycobacterium avium-intracellulare)  Depression  Bronchiectasis      S/P ACL surgery  1984        Social History:    FAMILY HISTORY:    Medications:     Vital Signs Last 24 Hrs  T(C): 36.6 (27 Mar 2023 13:16), Max: 36.6 (27 Mar 2023 13:16)  T(F): 97.9 (27 Mar 2023 13:16), Max: 97.9 (27 Mar 2023 13:16)  HR: 70 (27 Mar 2023 13:16) (70 - 70)  BP: 178/92 (27 Mar 2023 13:16) (178/92 - 178/92)  BP(mean): --  RR: 20 (27 Mar 2023 13:16) (20 - 20)  SpO2: 98% (27 Mar 2023 13:16) (98% - 98%)    Parameters below as of 27 Mar 2023 13:16  Patient On (Oxygen Delivery Method): room air        PE:  VS: stable, reviewed per nursing documentation  General: No acute distress, resting comfortably  Neuro: Alert, oriented x 3  Psych: Normal mood & affect  HEENT: normocephalic, atraumatic   Neck: trachea midline  Respiratory: nonlabored on room air   CV: no cyanosis, no peripheral edema   Skin: Warm, dry, no rash, no lesions, no abrasions no ecchymosis   MSK: atraumatic with exception of below  LUE    -Inspection/palpation: no break in skin, superficial abrasion on distal volar forearm ,  + tenderness to palpation over the dorsum of the radius, + swelling over the left wrist generalized, + deformity of the left wrist with dorsal angulation    -ROM: limited AROM/PROM secondary to pain of the left wrist, able to wiggle fingers    -Motor: triceps/biceps/ strength 5/5 bilateral upper extremities    - AIN/PIN/Ulnar/Median: firing LUE     -Sensation: intact to light touch bilateral upper extremities    -Pulses: 2+ radial pulses bilaterally, symmetric        Imaging: XR Left wrist: AP/lateral: left intraarticular distal radius fracture   XR left elbow - no fracture appreciated    A/P: 67yFemale with left wrist fracture  - Sugar tong splint applied: NVI following splint application, wiggling fingers appropriately  - Keep left sling on at all times, do not remove splint, keep dry   - Weight bearing status: Nonweightbearing left upper extremity, sling   - Pain control  - Post reduction films  - Follow up with Dr. Monzon outpatient tomorrow for surgical planning  - Went back to ED to see patient and her and her  already discharged  Patient history, exam, imaging and plan discussed with Dr. Monzon's PA, Dae Hunt, who is in agreement with plan.     Ortho Pager [phone]

## 2023-03-27 NOTE — ED PROVIDER NOTE - PROGRESS NOTE DETAILS
monika : pt eloped prior to ortho coming back down to el[place]. called pt and  and they said they have an appt to see Dr. Monzon tomorrow

## 2023-03-27 NOTE — ED PROVIDER NOTE - OBJECTIVE STATEMENT
66 y/o F RHD with PMHx MAC (on doxycycline), s/p ORIF of right distal radius in 2020 by Dr. Wesley Monzon presents to ED s/p mechanical trip and fall from standing height with FOOSH injury to left distal radius/wrist pta without associated weakness, numbness, tingling, or other injuries from the fall.

## 2023-03-27 NOTE — ED PROVIDER NOTE - MUSCULOSKELETAL, MLM
All bony prominences palpation except left distal radius where there is marked swelling and deformity present. Decreased ROM of left wrist. Abrasion noted to medial aspect of distal left ulna but no skin puncture. Strong radial pulses. Cap refill is <2s in all 5 fingers. Extremity is warm and well perfused. Normal color and temperature. Soft compartments.

## 2023-03-28 NOTE — CHART NOTE - NSCHARTNOTEFT_GEN_A_CORE
Had intended to eval patient independently with additional fluoroscopy images. Patient eloped before could attempt

## 2023-03-29 ENCOUNTER — NON-APPOINTMENT (OUTPATIENT)
Age: 68
End: 2023-03-29

## 2023-03-29 ENCOUNTER — APPOINTMENT (OUTPATIENT)
Dept: PULMONOLOGY | Facility: CLINIC | Age: 68
End: 2023-03-29
Payer: MEDICARE

## 2023-03-29 VITALS
OXYGEN SATURATION: 94 % | HEIGHT: 66 IN | BODY MASS INDEX: 19.44 KG/M2 | TEMPERATURE: 98.1 F | SYSTOLIC BLOOD PRESSURE: 151 MMHG | DIASTOLIC BLOOD PRESSURE: 92 MMHG | WEIGHT: 121 LBS | HEART RATE: 118 BPM

## 2023-03-29 DIAGNOSIS — A31.0 PULMONARY MYCOBACTERIAL INFECTION: ICD-10-CM

## 2023-03-29 DIAGNOSIS — Z01.818 ENCOUNTER FOR OTHER PREPROCEDURAL EXAMINATION: ICD-10-CM

## 2023-03-29 DIAGNOSIS — J44.9 CHRONIC OBSTRUCTIVE PULMONARY DISEASE, UNSPECIFIED: ICD-10-CM

## 2023-03-29 DIAGNOSIS — J47.9 BRONCHIECTASIS, UNCOMPLICATED: ICD-10-CM

## 2023-03-29 DIAGNOSIS — Z01.811 ENCOUNTER FOR PREPROCEDURAL RESPIRATORY EXAMINATION: ICD-10-CM

## 2023-03-29 PROCEDURE — 93000 ELECTROCARDIOGRAM COMPLETE: CPT

## 2023-03-29 PROCEDURE — 99204 OFFICE O/P NEW MOD 45 MIN: CPT | Mod: 25

## 2023-03-29 PROCEDURE — 36415 COLL VENOUS BLD VENIPUNCTURE: CPT

## 2023-03-29 PROCEDURE — 71045 X-RAY EXAM CHEST 1 VIEW: CPT

## 2023-03-29 RX ORDER — CLONAZEPAM 1 MG/1
1 TABLET ORAL
Refills: 0 | Status: ACTIVE | COMMUNITY
Start: 2023-03-29

## 2023-03-29 RX ORDER — RIFAMPIN 300 MG/1
CAPSULE ORAL
Refills: 0 | Status: DISCONTINUED | COMMUNITY
End: 2023-03-29

## 2023-03-29 RX ORDER — AZITHROMYCIN 250 MG/1
TABLET, FILM COATED ORAL
Refills: 0 | Status: DISCONTINUED | COMMUNITY
End: 2023-03-29

## 2023-03-29 RX ORDER — ASPIRIN 81 MG
81 TABLET, DELAYED RELEASE (ENTERIC COATED) ORAL
Refills: 0 | Status: DISCONTINUED | COMMUNITY
End: 2023-03-29

## 2023-03-29 RX ORDER — ETHAMBUTOL HYDROCHLORIDE 400 MG/1
TABLET ORAL
Refills: 0 | Status: DISCONTINUED | COMMUNITY
End: 2023-03-29

## 2023-03-29 NOTE — ASSESSMENT
[Patient Optimized for Surgery] : Patient optimized for surgery [No Further Testing Recommended] : no further testing recommended [ECG] : ECG [As per surgery] : as per surgery [High Risk Surgery - Intraperitoneal, Intrathoracic or Supringuinal Vascular Procedures] : High Risk Surgery - Intraperitoneal, Intrathoracic or Supringuinal Vascular Procedures - No (0) [Ischemic Heart Disease] : Ischemic Heart Disease - No (0) [Congestive Heart Failure] : Congestive Heart Failure - No (0) [Prior Cerebrovascular Accident or TIA] : Prior Cerebrovascular Accident or TIA - No (0) [Creatinine >= 2mg/dL (1 Point)] : Creatinine >= 2mg/dL - No (0) [Insulin-dependent Diabetic (1 Point)] : Insulin-dependent Diabetic - No (0) [0] : 0 , RCRI Class: I, Risk of Post-Op Cardiac Complications: 3.9%, 95% CI for Risk Estimate: 2.8% - 5.4% [FreeTextEntry4] : No contraindication for surgery. The medical condition is optimized for surgery. There is no evidence neither of angina, CHF, arrhythmias, nor valvular disease. There is no limitation of exercise capacity. VALDEMAR FALCON is to be extubated once fully awake and able to protect airway. she is to be monitored in the recovery room.  They might benefit for high flow oxygen or noninvasive ventilation to prevent or reverse atelectasis.  Avoid oversedation.The patient was given instructions regarding the preoperative preparation. I instructed the patient to notify me if there is any change in the clinical status prior the surgery including any skin manifestations. No aspirin or NSAIDs, Naproxen, LIMA inhibitors, herbs, green tea and vitamins prior to surgery. NPO after midnight prior to surg. \par \par CHRISTA/bronchiectasis - currently controlled pt on treatment.  Denies cough, hemoptysis or weight loss. Following with pulmonary.\par \par COPD - controlled on current triple therapy.  Denies any steroid use or hospitalizations.  \par \par HLD - pt tolerating statin follow with lipid panel with PCP\par \par

## 2023-03-29 NOTE — END OF VISIT
[Time Spent: ___ minutes] : I have spent [unfilled] minutes of time on the encounter. [>50% of the face to face encounter time was spent on counseling and/or coordination of care for ___] : Greater than 50% of the face to face encounter time was spent on counseling and/or coordination of care for [unfilled] [FreeTextEntry3] : Pt seen with YOLANDA Guthrie and agree on the above plan of care.  stable  cxr worsened from interstitial markings.  On rx for mac

## 2023-03-29 NOTE — RESULTS/DATA
[ECG Reviewed] : reviewed [Normal] : The 12 - lead ECG is normal [de-identified] : pending [de-identified] : pending [de-identified] : CXR in office.  With bilateral predominant markings worse in the LLL. No pleural effusion or PNA.  [de-identified] : pending  [FreeTextEntry1] : left atrial enlargement

## 2023-03-29 NOTE — HISTORY OF PRESENT ILLNESS
[No Pertinent Cardiac History] : no history of aortic stenosis, atrial fibrillation, coronary artery disease, recent myocardial infarction, or implantable device/pacemaker [COPD] : COPD [No Adverse Anesthesia Reaction] : no adverse anesthesia reaction in self or family member [(Patient denies any chest pain, claudication, dyspnea on exertion, orthopnea, palpitations or syncope)] : Patient denies any chest pain, claudication, dyspnea on exertion, orthopnea, palpitations or syncope [Spouse] : spouse [Chronic Anticoagulation] : no chronic anticoagulation [Chronic Kidney Disease] : no chronic kidney disease [Diabetes] : no diabetes [FreeTextEntry1] : Left Wrist Fracture  [FreeTextEntry2] : 4/3/2023 [FreeTextEntry3] : Dr. Wesley Monzon [FreeTextEntry4] : 64 yr old female with CHRISTA, bronchiectasis, COPD, appendectomy,  Former smoker Quit 1/17/2000, approx 20 yrs 2.5 - 1 pack a day, HLD and depression.  Presents today for medical clearance for surgery on left wrist.  She fell when working out with her  and broke her left wrist.\par \par Pt is currently on Doxycycline for 2 weeks due to worsening MAC.  She is scheduled for CT scan at [facility] on Friday to further evaluate the MAC.  Denies hemoptysis or fever.  She has had improvement in cough with AntibiOtic.  She uses the as needed vest, aerobika and saline nebulizer for her MAC.  Aug needed IV AntibiOtic for her MAC.  Pt is following with pulmonologist for her CHRISTA  ([facility] [phone]).  She is doing better with treatment. She is on trelegy which helps and has not needed prednisone.  \par \par Denies chest pain, abdominal pain, wheezing, diarrhea, fevers, palpitations.

## 2023-03-31 VITALS
DIASTOLIC BLOOD PRESSURE: 86 MMHG | RESPIRATION RATE: 18 BRPM | HEART RATE: 78 BPM | OXYGEN SATURATION: 98 % | WEIGHT: 123.46 LBS | HEIGHT: 66 IN | TEMPERATURE: 98 F | SYSTOLIC BLOOD PRESSURE: 137 MMHG

## 2023-03-31 RX ORDER — ASPIRIN/CALCIUM CARB/MAGNESIUM 324 MG
0 TABLET ORAL
Qty: 0 | Refills: 0 | DISCHARGE

## 2023-03-31 RX ORDER — ALENDRONATE SODIUM 70 MG/1
0 TABLET ORAL
Refills: 0 | DISCHARGE

## 2023-03-31 RX ORDER — AZITHROMYCIN 500 MG/1
0 TABLET, FILM COATED ORAL
Qty: 0 | Refills: 0 | DISCHARGE

## 2023-03-31 RX ORDER — ETHAMBUTOL HYDROCHLORIDE 400 MG/1
0 TABLET, FILM COATED ORAL
Qty: 0 | Refills: 0 | DISCHARGE

## 2023-03-31 RX ORDER — ASCORBIC ACID 60 MG
1 TABLET,CHEWABLE ORAL
Refills: 0 | DISCHARGE

## 2023-03-31 RX ORDER — CLONAZEPAM 1 MG
0 TABLET ORAL
Qty: 0 | Refills: 0 | DISCHARGE

## 2023-03-31 NOTE — ASU PATIENT PROFILE, ADULT - FALL HARM RISK - HARM RISK INTERVENTIONS

## 2023-04-02 ENCOUNTER — TRANSCRIPTION ENCOUNTER (OUTPATIENT)
Age: 68
End: 2023-04-02

## 2023-04-02 LAB
ALBUMIN SERPL ELPH-MCNC: 4.3 G/DL
ALP BLD-CCNC: 115 U/L
ALT SERPL-CCNC: 18 U/L
ANION GAP SERPL CALC-SCNC: 12 MMOL/L
APPEARANCE: CLEAR
APTT BLD: 34 SEC
AST SERPL-CCNC: 18 U/L
BASOPHILS # BLD AUTO: 0.09 K/UL
BASOPHILS NFR BLD AUTO: 0.7 %
BILIRUB SERPL-MCNC: 0.3 MG/DL
BILIRUBIN URINE: NEGATIVE
BLOOD URINE: NEGATIVE
BUN SERPL-MCNC: 12 MG/DL
CALCIUM SERPL-MCNC: 9.7 MG/DL
CHLORIDE SERPL-SCNC: 102 MMOL/L
CO2 SERPL-SCNC: 26 MMOL/L
COLOR: YELLOW
CREAT SERPL-MCNC: 0.87 MG/DL
EGFR: 72 ML/MIN/1.73M2
EOSINOPHIL # BLD AUTO: 0.14 K/UL
EOSINOPHIL NFR BLD AUTO: 1.1 %
GLUCOSE QUALITATIVE U: NEGATIVE
GLUCOSE SERPL-MCNC: 103 MG/DL
HCT VFR BLD CALC: 42.9 %
HGB BLD-MCNC: 13.1 G/DL
IMM GRANULOCYTES NFR BLD AUTO: 0.7 %
INR PPP: 1.16 RATIO
KETONES URINE: NEGATIVE
LEUKOCYTE ESTERASE URINE: NEGATIVE
LYMPHOCYTES # BLD AUTO: 1.85 K/UL
LYMPHOCYTES NFR BLD AUTO: 14.9 %
MAN DIFF?: NORMAL
MCHC RBC-ENTMCNC: 27.4 PG
MCHC RBC-ENTMCNC: 30.5 GM/DL
MCV RBC AUTO: 89.7 FL
MONOCYTES # BLD AUTO: 1.02 K/UL
MONOCYTES NFR BLD AUTO: 8.2 %
NEUTROPHILS # BLD AUTO: 9.21 K/UL
NEUTROPHILS NFR BLD AUTO: 74.4 %
NITRITE URINE: NEGATIVE
PH URINE: 5.5
PLATELET # BLD AUTO: 352 K/UL
POTASSIUM SERPL-SCNC: 4.4 MMOL/L
PROT SERPL-MCNC: 6.8 G/DL
PROTEIN URINE: NEGATIVE
PT BLD: 13.7 SEC
RBC # BLD: 4.78 M/UL
RBC # FLD: 15.5 %
SODIUM SERPL-SCNC: 140 MMOL/L
SPECIFIC GRAVITY URINE: 1.01
UROBILINOGEN URINE: NORMAL
WBC # FLD AUTO: 12.4 K/UL

## 2023-04-02 NOTE — PRE-ANESTHESIA EVALUATION ADULT - NSANTHPMHFT_GEN_ALL_CORE
68 y/o F RHD with PMHx MAC (on doxycycline), s/p ORIF of right distal radius in 2020 by Dr. Wesley Monzon presents to ED s/p mechanical trip and fall from standing height with FOOSH injury to left distal radius/wrist pta without associated weakness, numbness, tingling, or other injuries from the fall.  64 yr old female with CHRISTA, bronchiectasis, COPD, appendectomy, Former smoker Quit 1/17/2000, approx 20 yrs 2.5 - 1 pack a day, HLD and depression.  She fell when working out with her  and broke her left wrist.    Pt is currently on Doxycycline for 2 weeks due to worsening MAC. She is scheduled for CT scan at [facility] on Friday to further evaluate the MAC. Denies hemoptysis or fever. She has had improvement in cough with AntibiOtic. She uses the as needed vest, aerobika and saline nebulizer for her MAC. Aug needed IV AntibiOtic for her MAC. Pt is following with pulmonologist for her CHRISTA ([facility] [phone]). She is doing better with treatment. She is on trelegy which helps and has not needed prednisone. 68 y/o F RHD with PMHx MAC (on doxycycline), s/p ORIF of right distal radius in 2020 by Dr. Wesley Monzon presents to ED s/p mechanical trip and fall from standing height with FOOSH injury to left distal radius/wrist pta without associated weakness, numbness, tingling, or other injuries from the fall.  64 yr old female with CHRISTA, bronchiectasis, COPD, appendectomy, Former smoker Quit 1/17/2000, approx 20 yrs 2.5 - 1 pack a day, HLD and depression.  She fell when working out with her  and broke her left wrist.

## 2023-04-02 NOTE — PRE-ANESTHESIA EVALUATION ADULT - NSANTHLABRESULTSFT_GEN_ALL_CORE
EXAM:  XR CHEST PA LAT PST 2V                          PROCEDURE DATE:  02/21/2020          INTERPRETATION:  Clinical History: Preop    Frontal and lateral examination of the chest demonstrates the heart to be within normal limits in transverse diameter. No acute infiltrates. Degenerative change thoracic spine. Calcification aortic knob.    Impression: No acute infiltrates

## 2023-04-03 ENCOUNTER — TRANSCRIPTION ENCOUNTER (OUTPATIENT)
Age: 68
End: 2023-04-03

## 2023-04-03 ENCOUNTER — OUTPATIENT (OUTPATIENT)
Dept: OUTPATIENT SERVICES | Facility: HOSPITAL | Age: 68
LOS: 1 days | Discharge: HOME | End: 2023-04-03
Payer: MEDICARE

## 2023-04-03 VITALS
OXYGEN SATURATION: 95 % | RESPIRATION RATE: 20 BRPM | SYSTOLIC BLOOD PRESSURE: 121 MMHG | DIASTOLIC BLOOD PRESSURE: 69 MMHG | HEART RATE: 81 BPM

## 2023-04-03 DIAGNOSIS — Z98.890 OTHER SPECIFIED POSTPROCEDURAL STATES: Chronic | ICD-10-CM

## 2023-04-03 PROCEDURE — 76000 FLUOROSCOPY <1 HR PHYS/QHP: CPT

## 2023-04-03 PROCEDURE — 25609 OPTX DST RD XART FX/EP SEP3+: CPT | Mod: LT

## 2023-04-03 PROCEDURE — C1713: CPT

## 2023-04-03 DEVICE — K-WIRE MICROAIRE (SMOOTH) DOUBLE TROCAR 1.6MM X 4": Type: IMPLANTABLE DEVICE | Status: FUNCTIONAL

## 2023-04-03 DEVICE — SCREW CORT 3.5X14MM: Type: IMPLANTABLE DEVICE | Status: FUNCTIONAL

## 2023-04-03 DEVICE — K-WIRE MICROAIRE (SMOOTH) DOUBLE TROCAR 1.1MM X 4": Type: IMPLANTABLE DEVICE | Status: FUNCTIONAL

## 2023-04-03 DEVICE — IMPLANTABLE DEVICE: Type: IMPLANTABLE DEVICE | Status: FUNCTIONAL

## 2023-04-03 RX ORDER — ATORVASTATIN CALCIUM 80 MG/1
0 TABLET, FILM COATED ORAL
Qty: 0 | Refills: 0 | DISCHARGE

## 2023-04-03 RX ORDER — OXYCODONE HYDROCHLORIDE 5 MG/1
2.5 TABLET ORAL EVERY 4 HOURS
Refills: 0 | Status: DISCONTINUED | OUTPATIENT
Start: 2023-04-03 | End: 2023-04-03

## 2023-04-03 RX ORDER — BUPROPION HYDROCHLORIDE 150 MG/1
1 TABLET, EXTENDED RELEASE ORAL
Refills: 0 | DISCHARGE

## 2023-04-03 RX ORDER — ONDANSETRON 8 MG/1
4 TABLET, FILM COATED ORAL ONCE
Refills: 0 | Status: DISCONTINUED | OUTPATIENT
Start: 2023-04-03 | End: 2023-04-03

## 2023-04-03 RX ORDER — ACETAMINOPHEN WITH CODEINE 300MG-30MG
1 TABLET ORAL
Qty: 20 | Refills: 0
Start: 2023-04-03 | End: 2023-04-07

## 2023-04-03 RX ORDER — DIPHENHYDRAMINE HCL 50 MG
25 CAPSULE ORAL EVERY 4 HOURS
Refills: 0 | Status: DISCONTINUED | OUTPATIENT
Start: 2023-04-03 | End: 2023-04-03

## 2023-04-03 RX ORDER — ACETAMINOPHEN 500 MG
1000 TABLET ORAL ONCE
Refills: 0 | Status: COMPLETED | OUTPATIENT
Start: 2023-04-03 | End: 2023-04-03

## 2023-04-03 RX ORDER — HYDROMORPHONE HYDROCHLORIDE 2 MG/ML
0.5 INJECTION INTRAMUSCULAR; INTRAVENOUS; SUBCUTANEOUS
Refills: 0 | Status: DISCONTINUED | OUTPATIENT
Start: 2023-04-03 | End: 2023-04-03

## 2023-04-03 RX ORDER — CHOLECALCIFEROL (VITAMIN D3) 125 MCG
0 CAPSULE ORAL
Refills: 0 | DISCHARGE

## 2023-04-03 RX ORDER — OMEGA-3 ACID ETHYL ESTERS 1 G
0 CAPSULE ORAL
Refills: 0 | DISCHARGE

## 2023-04-03 RX ORDER — ACETAMINOPHEN 500 MG
975 TABLET ORAL EVERY 8 HOURS
Refills: 0 | Status: DISCONTINUED | OUTPATIENT
Start: 2023-04-03 | End: 2023-04-03

## 2023-04-03 RX ORDER — BACILLUS COAGULANS/INULIN 1B-250 MG
1 CAPSULE ORAL
Refills: 0 | DISCHARGE

## 2023-04-03 RX ORDER — SODIUM CHLORIDE 9 MG/ML
1000 INJECTION INTRAMUSCULAR; INTRAVENOUS; SUBCUTANEOUS
Refills: 0 | Status: DISCONTINUED | OUTPATIENT
Start: 2023-04-03 | End: 2023-04-03

## 2023-04-03 RX ORDER — CEFAZOLIN SODIUM 1 G
2000 VIAL (EA) INJECTION EVERY 8 HOURS
Refills: 0 | Status: COMPLETED | OUTPATIENT
Start: 2023-04-03 | End: 2023-04-04

## 2023-04-03 RX ORDER — OXYCODONE HYDROCHLORIDE 5 MG/1
5 TABLET ORAL EVERY 4 HOURS
Refills: 0 | Status: DISCONTINUED | OUTPATIENT
Start: 2023-04-03 | End: 2023-04-03

## 2023-04-03 RX ADMIN — Medication 1000 MILLIGRAM(S): at 11:30

## 2023-04-03 RX ADMIN — HYDROMORPHONE HYDROCHLORIDE 0.5 MILLIGRAM(S): 2 INJECTION INTRAMUSCULAR; INTRAVENOUS; SUBCUTANEOUS at 12:15

## 2023-04-03 RX ADMIN — Medication 400 MILLIGRAM(S): at 11:17

## 2023-04-03 RX ADMIN — HYDROMORPHONE HYDROCHLORIDE 0.5 MILLIGRAM(S): 2 INJECTION INTRAMUSCULAR; INTRAVENOUS; SUBCUTANEOUS at 12:30

## 2023-04-03 NOTE — ASU DISCHARGE PLAN (ADULT/PEDIATRIC) - CARE PROVIDER_API CALL
Wesley Monzon)  Orthopaedic Surgery  [address]  [place], [place] [address]  Phone: [phone]  Fax: [phone]  Follow Up Time:

## 2023-04-03 NOTE — BRIEF OPERATIVE NOTE - NSICDXBRIEFPROCEDURE_GEN_ALL_CORE_FT
PROCEDURES:  Open reduction and internal fixation of distal radius and ulna 03-Apr-2023 08:03:40  Ghulam Verde

## 2023-04-03 NOTE — ASU DISCHARGE PLAN (ADULT/PEDIATRIC) - NS MD DC FALL RISK RISK
For information on Fall & Injury Prevention, visit: [email or web address] OR  [email or web address] OR  https://www.cdc.gov/steadi/patient.html

## 2023-07-28 ENCOUNTER — APPOINTMENT (OUTPATIENT)
Dept: ENDOCRINOLOGY | Facility: CLINIC | Age: 68
End: 2023-07-28

## 2023-11-22 NOTE — ASU PREOP CHECKLIST - 1.
Pt here for port labs; labs obtained without difficulty; pt aware of next appt; left unit ambulatory with steady gait. Right arm sling

## 2024-01-28 ENCOUNTER — EMERGENCY (EMERGENCY)
Facility: HOSPITAL | Age: 69
LOS: 1 days | Discharge: HOME | End: 2024-01-28
Attending: EMERGENCY MEDICINE | Admitting: EMERGENCY MEDICINE
Payer: MEDICARE

## 2024-01-28 VITALS
OXYGEN SATURATION: 95 % | TEMPERATURE: 98 F | RESPIRATION RATE: 16 BRPM | SYSTOLIC BLOOD PRESSURE: 140 MMHG | DIASTOLIC BLOOD PRESSURE: 83 MMHG | HEIGHT: 66 IN | HEART RATE: 88 BPM | WEIGHT: 119.93 LBS

## 2024-01-28 DIAGNOSIS — S60.222A CONTUSION OF LEFT HAND, INITIAL ENCOUNTER: ICD-10-CM

## 2024-01-28 DIAGNOSIS — Z98.890 OTHER SPECIFIED POSTPROCEDURAL STATES: Chronic | ICD-10-CM
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
## 2024-01-28 DIAGNOSIS — M79.642 PAIN IN LEFT HAND: ICD-10-CM

## 2024-01-28 DIAGNOSIS — Z88.5 ALLERGY STATUS TO NARCOTIC AGENT: ICD-10-CM
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
## 2024-01-28 DIAGNOSIS — Z88.1 ALLERGY STATUS TO OTHER ANTIBIOTIC AGENTS STATUS: ICD-10-CM

## 2024-01-28 DIAGNOSIS — Z87.81 PERSONAL HISTORY OF (HEALED) TRAUMATIC FRACTURE: ICD-10-CM

## 2024-01-28 PROCEDURE — 73130 X-RAY EXAM OF HAND: CPT

## 2024-01-28 PROCEDURE — 99284 EMERGENCY DEPT VISIT MOD MDM: CPT

## 2024-01-28 PROCEDURE — 73130 X-RAY EXAM OF HAND: CPT | Mod: 26,LT

## 2024-01-28 PROCEDURE — 99283 EMERGENCY DEPT VISIT LOW MDM: CPT | Mod: 25

## 2024-01-28 NOTE — ED PROVIDER NOTE - NSFOLLOWUPINSTRUCTIONS_ED_ALL_ED_FT
Keep hand elevated, apply ice, and wear splint for comfort. Take tylenol or ibuprofen as needed for pain. Follow up with your primary care physician and/or hand specialist for re-evaluation. Return to er for any new or worsening symptoms.    Contusion  A contusion is a deep bruise. This is a result of an injury that causes bleeding under the skin. Symptoms of bruising include pain, swelling, and discolored skin. The skin may turn blue, purple, or yellow.    Follow these instructions at home:  Managing pain, stiffness, and swelling    Bag of ice on a towel on the skin.  You may use RICE. This stands for:  Resting.  Icing.  Compression, or putting pressure on the injured area.  Elevating, or raising the injured area.  To follow this method, do these actions:  Rest the injured area.  If told, put ice on the injured area. To do this:  Put ice in a plastic bag.  Place a towel between your skin and the bag.  Leave the ice on for 20 minutes, 2–3 times per day.  If your skin turns bright red, take off the ice right away to prevent skin damage. The risk of skin damage is higher if you cannot feel pain, heat, or cold.  If told, apply compression on the injured area using an elastic bandage. Make sure the bandage is not too tight. If the area tingles or has a loss of feeling (numbness), remove it and put it back on as told by your doctor.  If possible, elevate the injured area above the level of your heart while you are sitting or lying down.  General instructions    Take over-the-counter and prescription medicines only as told by your doctor.  Keep all follow-up visits. Your doctor may want to see how your contusion is healing with treatment.  Contact a doctor if:  Your symptoms do not get better after several days of treatment.  Your symptoms get worse.  You have trouble moving the injured area.  Get help right away if:  You have very bad pain.  You have a loss of feeling (numbness) in a hand or foot.  Your hand or foot turns pale or cold.  This information is not intended to replace advice given to you by your health care provider. Make sure you discuss any questions you have with your health care provider.    Document Revised: 06/05/2023 Document Reviewed: 06/05/2023  [employer] Patient Education © 2023 [employer]

## 2024-01-28 NOTE — ED PROVIDER NOTE - PHYSICAL EXAMINATION
VITAL SIGNS: I have reviewed nursing notes and confirm.  CONSTITUTIONAL: Well appearing, in no acute distress.   SKIN:  warm and dry, no acute rash.   HEAD:  normocephalic, atraumatic.  EYES: EOM intact; conjunctiva and sclera clear.  ENT: No nasal discharge; airway clear.   NECK: Supple.  LEFT HAND + swelling, ecchymosis and ttp to left thenar eminence. No ttp of carpal bones including scaphoid. No ttp of digits with normal rom at mcp, pip, dip jts.< 2s cap refill. Pulses intact and equal b/l.  NEURO: Alert, oriented, motor/ sensation grossly intact.

## 2024-01-28 NOTE — ED ADULT NURSE NOTE - OBJECTIVE STATEMENT
Patient came to ER stating she fell yesterday and hurt her left thumb but did not want to come to ER till she noticed it was getting move swollen. Patient has +CMS. Patient denies head injury.

## 2024-01-28 NOTE — ED PROVIDER NOTE - OBJECTIVE STATEMENT
Pt is a 69yo f, h/o MAC, RHD, s/p ORIF of right distal radius '20, s/p ORIF left distal radius and ulna '23, who p/w left hand pain s/p fall onto it yesterday. No significant pain afterwards, however pt noted swelling and bruising to hand today prompting ed visit. No n/t/w. No head trauma/ loc. No injury elsewhere.

## 2024-01-28 NOTE — ED PROVIDER NOTE - PATIENT PORTAL LINK FT
You can access the FollowMyHealth Patient Portal offered by [facility] by registering at the following website: [email or web address]. By joining [employer]’s FollowMyHealth portal, you will also be able to view your health information using other applications (apps) compatible with our system.

## 2024-01-28 NOTE — ED ADULT NURSE NOTE - NSFALLUNIVINTERV_ED_ALL_ED
Bed/Stretcher in lowest position, wheels locked, appropriate side rails in place/Call bell, personal items and telephone in reach/Instruct patient to call for assistance before getting out of bed/chair/stretcher/Non-slip footwear applied when patient is off stretcher/[place] to call system/Physically safe environment - no spills, clutter or unnecessary equipment/Purposeful proactive rounding/Room/bathroom lighting operational, light cord in reach

## 2024-01-28 NOTE — ED PROVIDER NOTE - CLINICAL SUMMARY MEDICAL DECISION MAKING FREE TEXT BOX
Impression: Pt is a 69yo f, h/o MAC, RHD, s/p ORIF of right distal radius '20, s/p ORIF left distal radius and ulna '23, who p/w left hand pain s/p fall onto it yesterday. No significant pain afterwards, however pt noted swelling and bruising to hand today prompting ed visit. No n/t/w. No head trauma/ loc. No injury elsewhere. Afebrile. HDS. + swelling, ttp and ecchymosis to left thenar eminence. NVI. Will obtain xray to r/o acute fx/ dislocation. Impression: Pt is a 69yo f, h/o MAC, RHD, s/p ORIF of right distal radius '20, s/p ORIF left distal radius and ulna '23, who p/w left hand pain s/p fall onto it yesterday. No significant pain afterwards, however pt noted swelling and bruising to hand today prompting ed visit. No n/t/w. No head trauma/ loc. No injury elsewhere. Afebrile. HDS. + swelling, ttp and ecchymosis to left thenar eminence. NVI. Will obtain xray to r/o acute fx/ dislocation.    Xrays neg for acute findings. ED evaluation and management discussed with the patient in detail.  Pre-fabricated thumb spica splint placed. NVI post splinting. Close PMD/ hand follow up encouraged.  Strict ED return instructions discussed in detail and patient given the opportunity to ask any questions about their discharge diagnosis and instructions. Patient verbalized understanding.

## 2024-01-28 NOTE — ED PROVIDER NOTE - CARE PROVIDER_API CALL
Wesley Monzon  Orthopaedic Surgery  [address]  [place] [address]  Phone: [phone]  Fax: [phone]  Follow Up Time:

## 2024-01-30 ENCOUNTER — TRANSCRIPTION ENCOUNTER (OUTPATIENT)
Age: 69
End: 2024-01-30

## 2024-08-07 ENCOUNTER — EMERGENCY (EMERGENCY)
Facility: HOSPITAL | Age: 69
LOS: 1 days | Discharge: HOME | End: 2024-08-07
Attending: STUDENT IN AN ORGANIZED HEALTH CARE EDUCATION/TRAINING PROGRAM | Admitting: STUDENT IN AN ORGANIZED HEALTH CARE EDUCATION/TRAINING PROGRAM
Payer: MEDICARE

## 2024-08-07 VITALS
SYSTOLIC BLOOD PRESSURE: 161 MMHG | DIASTOLIC BLOOD PRESSURE: 92 MMHG | RESPIRATION RATE: 18 BRPM | TEMPERATURE: 98 F | HEART RATE: 79 BPM | OXYGEN SATURATION: 98 %

## 2024-08-07 DIAGNOSIS — Z88.1 ALLERGY STATUS TO OTHER ANTIBIOTIC AGENTS: ICD-10-CM

## 2024-08-07 DIAGNOSIS — Z98.890 OTHER SPECIFIED POSTPROCEDURAL STATES: Chronic | ICD-10-CM

## 2024-08-07 DIAGNOSIS — B02.9 ZOSTER WITHOUT COMPLICATIONS: ICD-10-CM

## 2024-08-07 DIAGNOSIS — R21 RASH AND OTHER NONSPECIFIC SKIN ERUPTION: ICD-10-CM

## 2024-08-07 DIAGNOSIS — Z88.5 ALLERGY STATUS TO NARCOTIC AGENT: ICD-10-CM

## 2024-08-07 PROCEDURE — 99284 EMERGENCY DEPT VISIT MOD MDM: CPT

## 2024-08-07 PROCEDURE — 99283 EMERGENCY DEPT VISIT LOW MDM: CPT

## 2024-08-07 RX ADMIN — Medication 1000 MILLIGRAM(S): at 20:35

## (undated) DEVICE — STAPLER SKIN PROXIMATE

## (undated) DEVICE — PACK ORTHO ELBOW HAND

## (undated) DEVICE — SUT VICRYL 0 36" CT-1 UNDYED

## (undated) DEVICE — Device

## (undated) DEVICE — SAW BLADE STRYKER PRECISION 9X0.51X31MM

## (undated) DEVICE — SUT VICRYL 2-0 27" CT-1 UNDYED

## (undated) DEVICE — DRSG XEROFORM 1 X 8"

## (undated) DEVICE — TOURNIQUET CUFF 34" DUAL PORT W PLC

## (undated) DEVICE — DRAPE C ARM MINI